# Patient Record
Sex: MALE | Race: WHITE
[De-identification: names, ages, dates, MRNs, and addresses within clinical notes are randomized per-mention and may not be internally consistent; named-entity substitution may affect disease eponyms.]

---

## 2018-07-22 ENCOUNTER — HOSPITAL ENCOUNTER (INPATIENT)
Dept: HOSPITAL 11 - JP.ED | Age: 83
LOS: 3 days | Discharge: HOME HEALTH | DRG: 872 | End: 2018-07-25
Attending: INTERNAL MEDICINE | Admitting: INTERNAL MEDICINE
Payer: MEDICARE

## 2018-07-22 DIAGNOSIS — Z87.891: ICD-10-CM

## 2018-07-22 DIAGNOSIS — H91.90: ICD-10-CM

## 2018-07-22 DIAGNOSIS — E11.40: ICD-10-CM

## 2018-07-22 DIAGNOSIS — B96.1: ICD-10-CM

## 2018-07-22 DIAGNOSIS — Z92.21: ICD-10-CM

## 2018-07-22 DIAGNOSIS — A41.9: Primary | ICD-10-CM

## 2018-07-22 DIAGNOSIS — M54.9: ICD-10-CM

## 2018-07-22 DIAGNOSIS — Z66: ICD-10-CM

## 2018-07-22 DIAGNOSIS — K52.9: ICD-10-CM

## 2018-07-22 DIAGNOSIS — Z90.49: ICD-10-CM

## 2018-07-22 DIAGNOSIS — Z85.6: ICD-10-CM

## 2018-07-22 DIAGNOSIS — N40.0: ICD-10-CM

## 2018-07-22 DIAGNOSIS — A41.59: ICD-10-CM

## 2018-07-22 DIAGNOSIS — N39.0: ICD-10-CM

## 2018-07-22 DIAGNOSIS — R53.1: ICD-10-CM

## 2018-07-22 DIAGNOSIS — Z79.82: ICD-10-CM

## 2018-07-22 DIAGNOSIS — G89.29: ICD-10-CM

## 2018-07-22 DIAGNOSIS — R50.9: ICD-10-CM

## 2018-07-22 PROCEDURE — 87077 CULTURE AEROBIC IDENTIFY: CPT

## 2018-07-22 PROCEDURE — 71045 X-RAY EXAM CHEST 1 VIEW: CPT

## 2018-07-22 PROCEDURE — 87186 SC STD MICRODIL/AGAR DIL: CPT

## 2018-07-22 PROCEDURE — 80053 COMPREHEN METABOLIC PANEL: CPT

## 2018-07-22 PROCEDURE — 86140 C-REACTIVE PROTEIN: CPT

## 2018-07-22 PROCEDURE — 36415 COLL VENOUS BLD VENIPUNCTURE: CPT

## 2018-07-22 PROCEDURE — 81001 URINALYSIS AUTO W/SCOPE: CPT

## 2018-07-22 PROCEDURE — 83605 ASSAY OF LACTIC ACID: CPT

## 2018-07-22 PROCEDURE — C9113 INJ PANTOPRAZOLE SODIUM, VIA: HCPCS

## 2018-07-22 PROCEDURE — 87040 BLOOD CULTURE FOR BACTERIA: CPT

## 2018-07-22 PROCEDURE — 85025 COMPLETE CBC W/AUTO DIFF WBC: CPT

## 2018-07-22 PROCEDURE — 87086 URINE CULTURE/COLONY COUNT: CPT

## 2018-07-22 PROCEDURE — 99285 EMERGENCY DEPT VISIT HI MDM: CPT

## 2018-07-22 PROCEDURE — 93005 ELECTROCARDIOGRAM TRACING: CPT

## 2018-07-22 NOTE — PCM.HP
H&P History of Present Illness





- General


Date of Service: 07/22/18


Admit Problem/Dx: 


 Admission Diagnosis/Problem





Admission Diagnosis/Problem      Urosepsis








Source of Information: Patient


History Limitations: Reports: No Limitations





- History of Present Illness


Initial Comments - Free Text/Narative: 








This man arrived by EMS with complaint of weakness and fever.  It started last 

night with not feeling well. At noon today he felt cold and then suddenly weak. 

No N/V.  Hx chronic diarrhea.  History of Lymphoma chemo 2014.  Today had pain 

in his left leg.  Hurt to move it.  Doesn't hurt now.  Wife wants him admitted.





Onset of Symptoms: Reports: Gradual


Duration of Symptoms: Reports: Week(s): (one week of urinary symptoms, weak flow

, pain,pressure and intermittent fevers.)


Location: Reports: Generalized (fever)


Severity: Moderate


Improves with: Reports: None


Worsens with: Reports: None


Associated Symptoms: Reports: Fever/Chills, Malaise, Other (dysuria)





- Related Data


Allergies/Adverse Reactions: 


 Allergies











Allergy/AdvReac Type Severity Reaction Status Date / Time


 


No Known Allergies Allergy   Verified 07/22/18 19:50











Home Medications: 


 Home Meds





Aspirin [Rosa Chewable] 81 mg PO DAILY 07/22/18 [History]


Bifidobacterium Infantis [Align] 4 mg PO DAILY 07/22/18 [History]


Colestipol HCl 2 tab PO BEDTIME 07/22/18 [History]


Finasteride 5 mg PO DAILY 07/22/18 [History]


Loperamide [Imodium] 2 mg PO BID 07/22/18 [History]


Simethicone [Phazyme] 250 mg PO DAILY PRN 07/22/18 [History]











Past Medical History


HEENT History: Reports: Hard of Hearing


Other HEENT History: hearing aides


Gastrointestinal History: Reports: Other (See Below)


Other Gastrointestinal History: diarrhea,


Genitourinary History: Reports: BPH, Prostate Disorder


Musculoskeletal History: Reports: Back Pain, Chronic


Neurological History: Reports: Neuropathy, Diabetic


Endocrine/Metabolic History: Reports: Diabetes, Type II


Hematologic History: Reports: Anemia


Oncologic (Cancer) History: Reports: Other (See Below)


Other Oncologic History: leukemia





- Past Surgical History


GI Surgical History: Reports: Colonoscopy, Other (See Below)


Other GI Surgeries/Procedures: portion of small and large intestine removed 

2014 b/c of tumor when he had leukemia


Oncologic Surgical History: Reports: None


Dermatological Surgical History: Reports: Other (See Below)





Social & Family History





- Family History


Family Medical History: Noncontributory





- Tobacco Use


Smoking Status *Q: Former Smoker


Used Tobacco, but Quit: Yes


Month/Year Tobacco Last Used: 30





- Caffeine Use


Caffeine Use: Reports: Coffee





- Alcohol Use


Days Per Week of Alcohol Use: 5


Number of Drinks Per Day: 2


Total Drinks Per Week: 10





- Recreational Drug Use


Recreational Drug Use: No





- Living Situation & Occupation


Living situation: Reports: 


Occupation: Retired (lives with Wife in [place], [place]. has a [place] home in 

[place], [place]. has been there for the past 10 days.)





H&P Review of Systems





- Review of Systems:


Review Of Systems: See Below


General: Reports: Fever, Chills, Weakness, Fatigue, Decreased Appetite


HEENT: Reports: Glasses, Other (partial)


Pulmonary: Reports: No Symptoms


Cardiovascular: Reports: No Symptoms


Gastrointestinal: Reports: Abdominal Pain (low pelvic pain), Decreased Appetite

, Nausea, Other (last bowel movement 7/21/2018)


Genitourinary: Reports: Dysuria, Frequency, Burning, Pain, Urgency, Other (

bladder symptoms for one week.)


Musculoskeletal: Reports: No Symptoms


Skin: Reports: No Symptoms


Psychiatric: Reports: No Symptoms


Neurological: Reports: Weakness


Hematologic/Lymphatic: Reports: No Symptoms


Immunologic: Reports: No Symptoms





Exam





- Exam


Exam: See Below





- Vital Signs


Vital Signs: 


 Last Vital Signs











Temp  38.8 C H  07/22/18 22:43


 


Pulse  97   07/22/18 22:01


 


Resp  16   07/22/18 22:01


 


BP  124/65   07/22/18 22:01


 


Pulse Ox  98   07/22/18 22:01











Weight: 144 kg





- Exam


General: Alert, Oriented, Cooperative, Mild Distress


HEENT: PERRLA, Hearing Intact, Mucosa Moist & Pink, Nares Patent, Normal Nasal 

Septum, Posterior Pharynx Clear, Conjunctiva Clear, EOMI, EACs Clear, TMs Clear


Neck: Supple, Trachea Midline


Lungs: Clear to Auscultation, Normal Respiratory Effort


Cardiovascular: Regular Rate, Regular Rhythm


GI/Abdominal Exam: Normal Bowel Sounds, Soft, No Organomegaly, No Distention, 

No Abnormal Bruit, No Mass, Pelvis Stable, Tender (over low pelvis area to 

palpation)


 (Male) Exam: Deferred


Rectal (Males) Exam: Deferred


Back Exam: Normal Inspection, Full Range of Motion, NT


Extremities: Normal Inspection, Normal Range of Motion, Non-Tender, No Pedal 

Edema, Normal Capillary Refill


Peripheral Pulses: 2+: Radial (L), Radial (R)


Skin: Warm, Dry, Intact


Neurological: Reflexes Equal Bilateral, Strength Equal Bilateral


Neuro Extensive - Mental Status: Alert, Oriented x3, Normal Mood/Affect, Normal 

Cognition


Neuro Extensive - Motor, Sensory, Reflexes: CN II-XII Intact


Psychiatric: Alert, Normal Affect, Normal Mood





- Patient Data


Lab Results Last 24 hrs: 


 Laboratory Results - last 24 hr











  07/22/18 07/22/18 07/22/18 Range/Units





  20:45 20:45 20:45 


 


WBC    12.1 H  (4.5-11.0)  K/uL


 


RBC    3.97 L  (4.30-5.90)  M/uL


 


Hgb    12.7  (12.0-15.0)  g/dL


 


Hct    36.7 L  (40.0-54.0)  %


 


MCV    92  (80-98)  fL


 


MCH    32 H  (27-31)  pg


 


MCHC    35  (32-36)  %


 


Plt Count    147 L  (150-400)  K/uL


 


Neut % (Auto)    78 H  (36-66)  %


 


Lymph % (Auto)    11 L  (24-44)  %


 


Mono % (Auto)    11 H  (2-6)  %


 


Eos % (Auto)    0 L  (2-4)  %


 


Baso % (Auto)    0  (0-1)  %


 


Sodium     (140-148)  mmol/L


 


Potassium     (3.6-5.2)  mmol/L


 


Chloride     (100-108)  mmol/L


 


Carbon Dioxide     (21-32)  mmol/L


 


Anion Gap     (5.0-14.0)  mmol/L


 


BUN     (7-18)  mg/dL


 


Creatinine     (0.8-1.3)  mg/dL


 


Est Cr Clr Drug Dosing     mL/min


 


Estimated GFR (MDRD)     (>60)  


 


Glucose     ()  mg/dL


 


Lactic Acid  1.6    (0.4-2.0)  mmol/L


 


Calcium     (8.5-10.1)  mg/dL


 


Total Bilirubin     (0.2-1.0)  mg/dL


 


AST     (15-37)  U/L


 


ALT     (12-78)  U/L


 


Alkaline Phosphatase     ()  U/L


 


C-Reactive Protein   8.64 H   (0.0-0.3)  mg/dL


 


Total Protein     (6.4-8.2)  g/dL


 


Albumin     (3.4-5.0)  g/dL


 


Globulin     (2.3-3.5)  g/dL


 


Albumin/Globulin Ratio     (1.2-2.2)  


 


Urine Color     


 


Urine Appearance     


 


Urine pH     (4.5-8.0)  


 


Ur Specific Gravity     (1.008-1.030)  


 


Urine Protein     (NEGATIVE)  mg/dL


 


Urine Glucose (UA)     (NEGATIVE)  mg/dL


 


Urine Ketones     (NEGATIVE)  mg/dL


 


Urine Occult Blood     (NEGATIVE)  


 


Urine Nitrite     (NEGAITVE)  


 


Urine Bilirubin     (NEGATIVE)  


 


Urine Urobilinogen     (NORMAL)  mg/dL


 


Ur Leukocyte Esterase     (NEGATIVE)  


 


Urine RBC     (0-5)  


 


Urine WBC     (0-5)  


 


Ur Epithelial Cells     


 


Amorphous Sediment     


 


Urine Bacteria     


 


Urine Mucus     














  07/22/18 07/22/18 Range/Units





  20:45 21:49 


 


WBC    (4.5-11.0)  K/uL


 


RBC    (4.30-5.90)  M/uL


 


Hgb    (12.0-15.0)  g/dL


 


Hct    (40.0-54.0)  %


 


MCV    (80-98)  fL


 


MCH    (27-31)  pg


 


MCHC    (32-36)  %


 


Plt Count    (150-400)  K/uL


 


Neut % (Auto)    (36-66)  %


 


Lymph % (Auto)    (24-44)  %


 


Mono % (Auto)    (2-6)  %


 


Eos % (Auto)    (2-4)  %


 


Baso % (Auto)    (0-1)  %


 


Sodium  131 L   (140-148)  mmol/L


 


Potassium  4.2   (3.6-5.2)  mmol/L


 


Chloride  96 L   (100-108)  mmol/L


 


Carbon Dioxide  24   (21-32)  mmol/L


 


Anion Gap  15.2 H   (5.0-14.0)  mmol/L


 


BUN  14   (7-18)  mg/dL


 


Creatinine  1.1   (0.8-1.3)  mg/dL


 


Est Cr Clr Drug Dosing  49.77   mL/min


 


Estimated GFR (MDRD)  > 60   (>60)  


 


Glucose  158 H   ()  mg/dL


 


Lactic Acid    (0.4-2.0)  mmol/L


 


Calcium  8.5   (8.5-10.1)  mg/dL


 


Total Bilirubin  0.7   (0.2-1.0)  mg/dL


 


AST  23   (15-37)  U/L


 


ALT  25   (12-78)  U/L


 


Alkaline Phosphatase  87   ()  U/L


 


C-Reactive Protein    (0.0-0.3)  mg/dL


 


Total Protein  7.0   (6.4-8.2)  g/dL


 


Albumin  3.3 L   (3.4-5.0)  g/dL


 


Globulin  3.7 H   (2.3-3.5)  g/dL


 


Albumin/Globulin Ratio  0.9 L   (1.2-2.2)  


 


Urine Color   Yellow  


 


Urine Appearance   Cloudy  


 


Urine pH   5.0  (4.5-8.0)  


 


Ur Specific Gravity   1.020  (1.008-1.030)  


 


Urine Protein   30 H  (NEGATIVE)  mg/dL


 


Urine Glucose (UA)   Normal  (NEGATIVE)  mg/dL


 


Urine Ketones   15 H  (NEGATIVE)  mg/dL


 


Urine Occult Blood   Large  (NEGATIVE)  


 


Urine Nitrite   Negative  (NEGAITVE)  


 


Urine Bilirubin   Negative  (NEGATIVE)  


 


Urine Urobilinogen   Normal  (NORMAL)  mg/dL


 


Ur Leukocyte Esterase   Large  (NEGATIVE)  


 


Urine RBC   Semi-packed H  (0-5)  


 


Urine WBC   Semi-packed H  (0-5)  


 


Ur Epithelial Cells   Few  


 


Amorphous Sediment   Not seen  


 


Urine Bacteria   Many  


 


Urine Mucus   Not seen  











Result Diagrams: 


 07/22/18 20:45





 07/22/18 20:45





- Problem List


(1) Sepsis due to urinary tract infection


SNOMED Code(s): 401013261


   ICD Code: A41.9 - SEPSIS, UNSPECIFIED ORGANISM; N39.0 - URINARY TRACT 

INFECTION, SITE NOT SPECIFIED   Status: Acute   Priority: High   Current Visit: 

Yes   





(2) Diabetes mellitus type 2 in nonobese


SNOMED Code(s): 481526906


   ICD Code: E11.9 - TYPE 2 DIABETES MELLITUS WITHOUT COMPLICATIONS   Status: 

Acute   Priority: High   Current Visit: Yes   


Problem List Initiated/Reviewed/Updated: Yes


Orders Last 24hrs: 


 Active Orders 24 hr











 Category Date Time Status


 


 Patient Status Manage Transfer [TRANSFER] Routine ADT  07/22/18 22:38 Active


 


 EKG Documentation Completion [RC] ASDIRECTED Care  07/22/18 20:42 Active


 


 Chest 1V Frontal [CR] Urgent Exams  07/22/18 20:41 Taken


 


 CULTURE BLOOD [BC] Urgent Lab  07/22/18 20:45 Received


 


 CULTURE BLOOD [BC] Urgent Lab  07/22/18 20:45 Received


 


 LACTIC ACID [CHEM] Stat Lab  07/23/18 04:11 Ordered


 


 UA W/MICROSCOPIC [URIN] Urgent Lab  07/22/18 21:49 Ordered


 


 Ciprofloxacin in D5W [Cipro in D5W 400 MG/200 ML] 400 Med  07/22/18 22:15 

Active





 mg   





 Premix Bag 1 bag   





 IV Q12H   


 


 Sodium Chloride 0.9% [Normal Saline] 1,000 ml Med  07/22/18 22:15 Active





 IV ASDIRECTED   


 


 Blood Culture x2 Reflex Set [OM.PC] Urgent Oth  07/22/18 20:42 Ordered


 


 Resuscitation Status Routine Resus Stat  07/22/18 22:39 Ordered


 


 EKG 12 Lead [EK] Urgent Ther  07/22/18 20:42 Ordered








 Medication Orders





Ciprofloxacin/Dextrose 400 mg/ (Premix)  200 mls @ 200 mls/hr IV Q12H [facility]


   Last Admin: 07/22/18 22:32  Dose: 200 mls/hr


Sodium Chloride (Normal Saline)  1,000 mls @ 999 mls/hr IV ASDIRECTED [facility]


   Last Admin: 07/22/18 22:32  Dose: 999 mls/hr








Assessment/Plan Comment:: 








Admission Template





ASSESSMENT / PLAN





This man arrived by EMS with complaint of weakness and fever.  It started last 

night with not feeling well. At noon today he felt cold and then suddenly weak. 

No N/V.  Hx chronic diarrhea.  History of Lymphoma chemo 2014.  Today had pain 

in his left leg.  Hurt to move it.  Doesn't hurt now.  Wife wants him admitted.





In ER, Mr Macedo noted to have fever 101.9-97-16 /65, his labsWBC 12.1, 

hgb 12.7, chemistry Na+131, K+ 4.2, cl 96, anion gap 15.2, bun 14, creat 1.1, 

clucose 158, Urinalysis WBC packed, RBC packed, bacteria many.  IV fluids and 

IV Cipro started in the ER. 





discussed plan of care with Mr. Macedo, he agrees to admission, but has concerns 

with staying in hospitial due to his Wife is traveling home today to [place], [place]. but will stay until he is well. 





Sepsis due to bladder infection


-Admit to [address] [place] [address] for further monitoring


-IV Fluids for rehydration NS at 125 mL per hour


-IV Antibiotic: Cipro 400 gram IV every 12 hours   


-Tyenol for fever control        


-Advise to notify nurses of any chest pain or other symptoms


-blood cultures x2 pending


-urine culture pending


-And a.m. labs: CBC, BMP, lactic acid at 0410





Diabetes type 2; diet controlled


-bmp in am


-consistent carb diet





Maintenance issues


-Orders home meds: ordered


-Nutrition: diabetic diet


-Graham catheter not indicated at this time


-DVT: SCD


-PPI: IV Protonix 40mg daily





CODE STATUS: DNR/DNI





Admission status: Admit to [address] [place] [address]


Admission justification.  This patient will be admitted for inpatient services 

and is medically appropriate meeting medical necessity for inpatient admission 

as outlined in my documentation. I reasonably expect the patient will require 

inpatient services that span.  Time over 2 midnights.  I reasonably expect this 

patient to be discharged or transferred within 96 hours after admission to the 

critical access hospital.





Disposition; home 





Primary care provider: Outside Provider, ALEXIS Webb





Hospitalist: Dr. Hansen

## 2018-07-22 NOTE — EDM.PDOC
ED HPI GENERAL MEDICAL PROBLEM





- General


Chief Complaint: General


Stated Complaint: WEAK VIA NORTH


Time Seen by Provider: 07/22/18 20:24


Source of Information: Reports: Patient, Family


History Limitations: Reports: No Limitations





- History of Present Illness


INITIAL COMMENTS - FREE TEXT/NARRATIVE: 





This man arrived by EMS with complaint of weakness and fever.  It started last 

night with not feeling well. At noon today he felt cold and then suddenly weak. 

No N/V.  Hx chronic diarrhea.  History of Lymphoma chemo 2014.  Today had pain 

in his left leg.  Hurt to move it.  Doesn't hurt now.  Wife wants him admitted.





- Related Data


 Allergies











Allergy/AdvReac Type Severity Reaction Status Date / Time


 


No Known Allergies Allergy   Verified 07/22/18 19:50











Home Meds: 


 Home Meds





Aspirin [Rosa Chewable] 81 mg PO DAILY 07/22/18 [History]


Bifidobacterium Infantis [Align] 4 mg PO DAILY 07/22/18 [History]


Colestipol HCl 2 tab PO BEDTIME 07/22/18 [History]


Finasteride 5 mg PO DAILY 07/22/18 [History]


Loperamide [Imodium] 2 mg PO BID 07/22/18 [History]


Simethicone [Phazyme] 250 mg PO DAILY PRN 07/22/18 [History]











Past Medical History


HEENT History: Reports: Hard of Hearing


Other HEENT History: hearing aides


Gastrointestinal History: Reports: Other (See Below)


Other Gastrointestinal History: diarrhea,


Genitourinary History: Reports: BPH, Prostate Disorder


Musculoskeletal History: Reports: Back Pain, Chronic


Neurological History: Reports: Neuropathy, Diabetic


Endocrine/Metabolic History: Reports: Diabetes, Type II


Hematologic History: Reports: Anemia


Oncologic (Cancer) History: Reports: Other (See Below)


Other Oncologic History: leukemia





- Past Surgical History


GI Surgical History: Reports: Colonoscopy, Other (See Below)


Other GI Surgeries/Procedures: portion of small and large intestine removed 

2014 b/c of tumor when he had leukemia


Oncologic Surgical History: Reports: None


Dermatological Surgical History: Reports: Other (See Below)





Social & Family History





- Family History


Family Medical History: Noncontributory





- Tobacco Use


Smoking Status *Q: Former Smoker


Used Tobacco, but Quit: Yes


Month/Year Tobacco Last Used: 30





- Caffeine Use


Caffeine Use: Reports: Coffee





- Alcohol Use


Days Per Week of Alcohol Use: 5


Number of Drinks Per Day: 2


Total Drinks Per Week: 10





- Recreational Drug Use


Recreational Drug Use: No





ED ROS GENERAL





- Review of Systems


Review Of Systems: See Below


Constitutional: Reports: Fever, Weakness


HEENT: Reports: No Symptoms


Respiratory: Reports: No Symptoms


Cardiovascular: Reports: No Symptoms


Endocrine: Reports: No Symptoms


GI/Abdominal: Reports: Diarrhea (chronic)


: Reports: No Symptoms


Musculoskeletal: Reports: Other (lt leg apin resolved)


Skin: Reports: No Symptoms


Neurological: Reports: No Symptoms


Psychiatric: Reports: No Symptoms


Hematologic/Lymphatic: Reports: No Symptoms


Immunologic: Reports: No Symptoms





ED EXAM, GENERAL





- Physical Exam


Exam: See Below


Exam Limited By: No Limitations


General Appearance: Alert, WD/WN, No Apparent Distress, Other (smiling talkative

)


Eye Exam: Bilateral Eye: EOMI, PERRL


Ears: Normal TMs, Other (hearing aides)


Nose: Normal Inspection


Throat/Mouth: Normal Inspection, Normal Oropharynx


Head: Atraumatic


Neck: Normal Inspection, Supple


Respiratory/Chest: Lungs Clear


Cardiovascular: Normal Peripheral Pulses, Regular Rate, Rhythm, No Murmur


GI/Abdominal: Normal Bowel Sounds, Soft, Non-Tender, Other (lower midline scar)


Back Exam: Normal Inspection


Extremities: Normal Inspection, Non-Tender, No Pedal Edema


Neurological: Alert, Oriented, CN II-XII Intact, Normal Cognition, No Motor/

Sensory Deficits


Psychiatric: Normal Affect


Skin Exam: Warm, Dry





Course





- Vital Signs


Last Recorded V/S: 


 Last Vital Signs











Temp  38.8 C H  07/22/18 22:01


 


Pulse  97   07/22/18 22:01


 


Resp  16   07/22/18 22:01


 


BP  124/65   07/22/18 22:01


 


Pulse Ox  98   07/22/18 22:01














- Orders/Labs/Meds


Orders: 


 Active Orders 24 hr











 Category Date Time Status


 


 EKG Documentation Completion [RC] ASDIRECTED Care  07/22/18 20:42 Active


 


 Chest 1V Frontal [CR] Urgent Exams  07/22/18 20:41 Taken


 


 CULTURE BLOOD [BC] Urgent Lab  07/22/18 20:45 Received


 


 CULTURE BLOOD [BC] Urgent Lab  07/22/18 20:45 Received


 


 UA W/MICROSCOPIC [URIN] Urgent Lab  07/22/18 21:49 Ordered


 


 Blood Culture x2 Reflex Set [OM.PC] Urgent Oth  07/22/18 20:42 Ordered


 


 EKG 12 Lead [EK] Urgent Ther  07/22/18 20:42 Ordered











Labs: 


 Laboratory Tests











  07/22/18 07/22/18 07/22/18 Range/Units





  20:45 20:45 20:45 


 


WBC    12.1 H  (4.5-11.0)  K/uL


 


RBC    3.97 L  (4.30-5.90)  M/uL


 


Hgb    12.7  (12.0-15.0)  g/dL


 


Hct    36.7 L  (40.0-54.0)  %


 


MCV    92  (80-98)  fL


 


MCH    32 H  (27-31)  pg


 


MCHC    35  (32-36)  %


 


Plt Count    147 L  (150-400)  K/uL


 


Neut % (Auto)    78 H  (36-66)  %


 


Lymph % (Auto)    11 L  (24-44)  %


 


Mono % (Auto)    11 H  (2-6)  %


 


Eos % (Auto)    0 L  (2-4)  %


 


Baso % (Auto)    0  (0-1)  %


 


Sodium     (140-148)  mmol/L


 


Potassium     (3.6-5.2)  mmol/L


 


Chloride     (100-108)  mmol/L


 


Carbon Dioxide     (21-32)  mmol/L


 


Anion Gap     (5.0-14.0)  mmol/L


 


BUN     (7-18)  mg/dL


 


Creatinine     (0.8-1.3)  mg/dL


 


Est Cr Clr Drug Dosing     mL/min


 


Estimated GFR (MDRD)     (>60)  


 


Glucose     ()  mg/dL


 


Lactic Acid  1.6    (0.4-2.0)  mmol/L


 


Calcium     (8.5-10.1)  mg/dL


 


Total Bilirubin     (0.2-1.0)  mg/dL


 


AST     (15-37)  U/L


 


ALT     (12-78)  U/L


 


Alkaline Phosphatase     ()  U/L


 


C-Reactive Protein   8.64 H   (0.0-0.3)  mg/dL


 


Total Protein     (6.4-8.2)  g/dL


 


Albumin     (3.4-5.0)  g/dL


 


Globulin     (2.3-3.5)  g/dL


 


Albumin/Globulin Ratio     (1.2-2.2)  


 


Urine Color     


 


Urine Appearance     


 


Urine pH     (4.5-8.0)  


 


Ur Specific Gravity     (1.008-1.030)  


 


Urine Protein     (NEGATIVE)  mg/dL


 


Urine Glucose (UA)     (NEGATIVE)  mg/dL


 


Urine Ketones     (NEGATIVE)  mg/dL


 


Urine Occult Blood     (NEGATIVE)  


 


Urine Nitrite     (NEGAITVE)  


 


Urine Bilirubin     (NEGATIVE)  


 


Urine Urobilinogen     (NORMAL)  mg/dL


 


Ur Leukocyte Esterase     (NEGATIVE)  


 


Urine RBC     (0-5)  


 


Urine WBC     (0-5)  


 


Ur Epithelial Cells     


 


Amorphous Sediment     


 


Urine Bacteria     


 


Urine Mucus     














  07/22/18 07/22/18 Range/Units





  20:45 21:49 


 


WBC    (4.5-11.0)  K/uL


 


RBC    (4.30-5.90)  M/uL


 


Hgb    (12.0-15.0)  g/dL


 


Hct    (40.0-54.0)  %


 


MCV    (80-98)  fL


 


MCH    (27-31)  pg


 


MCHC    (32-36)  %


 


Plt Count    (150-400)  K/uL


 


Neut % (Auto)    (36-66)  %


 


Lymph % (Auto)    (24-44)  %


 


Mono % (Auto)    (2-6)  %


 


Eos % (Auto)    (2-4)  %


 


Baso % (Auto)    (0-1)  %


 


Sodium  131 L   (140-148)  mmol/L


 


Potassium  4.2   (3.6-5.2)  mmol/L


 


Chloride  96 L   (100-108)  mmol/L


 


Carbon Dioxide  24   (21-32)  mmol/L


 


Anion Gap  15.2 H   (5.0-14.0)  mmol/L


 


BUN  14   (7-18)  mg/dL


 


Creatinine  1.1   (0.8-1.3)  mg/dL


 


Est Cr Clr Drug Dosing  49.77   mL/min


 


Estimated GFR (MDRD)  > 60   (>60)  


 


Glucose  158 H   ()  mg/dL


 


Lactic Acid    (0.4-2.0)  mmol/L


 


Calcium  8.5   (8.5-10.1)  mg/dL


 


Total Bilirubin  0.7   (0.2-1.0)  mg/dL


 


AST  23   (15-37)  U/L


 


ALT  25   (12-78)  U/L


 


Alkaline Phosphatase  87   ()  U/L


 


C-Reactive Protein    (0.0-0.3)  mg/dL


 


Total Protein  7.0   (6.4-8.2)  g/dL


 


Albumin  3.3 L   (3.4-5.0)  g/dL


 


Globulin  3.7 H   (2.3-3.5)  g/dL


 


Albumin/Globulin Ratio  0.9 L   (1.2-2.2)  


 


Urine Color   Yellow  


 


Urine Appearance   Cloudy  


 


Urine pH   5.0  (4.5-8.0)  


 


Ur Specific Gravity   1.020  (1.008-1.030)  


 


Urine Protein   30 H  (NEGATIVE)  mg/dL


 


Urine Glucose (UA)   Normal  (NEGATIVE)  mg/dL


 


Urine Ketones   15 H  (NEGATIVE)  mg/dL


 


Urine Occult Blood   Large  (NEGATIVE)  


 


Urine Nitrite   Negative  (NEGAITVE)  


 


Urine Bilirubin   Negative  (NEGATIVE)  


 


Urine Urobilinogen   Normal  (NORMAL)  mg/dL


 


Ur Leukocyte Esterase   Large  (NEGATIVE)  


 


Urine RBC   Semi-packed H  (0-5)  


 


Urine WBC   Semi-packed H  (0-5)  


 


Ur Epithelial Cells   Few  


 


Amorphous Sediment   Not seen  


 


Urine Bacteria   Many  


 


Urine Mucus   Not seen  














- Radiology Interpretation


Free Text/Narrative:: 





some scarring on right but no infiltrate





- Re-Assessments/Exams


Free Text/Narrative Re-Assessment/Exam: 





07/22/18 22:12


labs suggest urosepsis.  Angelina Whyte here and will admit.





Departure





- Departure


Time of Disposition: 22:13


Disposition: Admitted As Inpatient 66


Condition: Fair


Clinical Impression: 


 Urinary tract infection, Sepsis








- Discharge Information


Referrals: 


PCP,None [Primary Care Provider] - 


Forms:  ED Department Discharge





- My Orders


Last 24 Hours: 


My Active Orders





07/22/18 20:41


Chest 1V Frontal [CR] Urgent 





07/22/18 20:42


EKG Documentation Completion [RC] ASDIRECTED 


Blood Culture x2 Reflex Set [OM.PC] Urgent 


EKG 12 Lead [EK] Urgent 





07/22/18 20:45


CULTURE BLOOD [BC] Urgent 


CULTURE BLOOD [BC] Urgent 





07/22/18 21:49


UA W/MICROSCOPIC [URIN] Urgent 














- Assessment/Plan


Last 24 Hours: 


My Active Orders





07/22/18 20:41


Chest 1V Frontal [CR] Urgent 





07/22/18 20:42


EKG Documentation Completion [RC] ASDIRECTED 


Blood Culture x2 Reflex Set [OM.PC] Urgent 


EKG 12 Lead [EK] Urgent 





07/22/18 20:45


CULTURE BLOOD [BC] Urgent 


CULTURE BLOOD [BC] Urgent 





07/22/18 21:49


UA W/MICROSCOPIC [URIN] Urgent

## 2018-07-23 RX ADMIN — Medication SCH CAP: at 21:23

## 2018-07-23 RX ADMIN — COLESTIPOL HYDROCHLORIDE SCH GM: 1 TABLET ORAL at 21:23

## 2018-07-23 NOTE — PCM.PN
- General Info


Date of Service: 07/23/18


Subjective Update: 





Mr. Macedo is an 86-year-old gentleman who was admitted last night with weakness 

secondary to urinary tract infection and early sepsis. Blood pressures have 

been somewhat borderline since admission and he has received extra IV fluids, 

lactic acid level was mildly elevated this morning. Blood cultures 2 are 

growing gram-negative rods, final ID and sensitivities pending. White blood 

cell count has improved since admission and is now within normal range. He 

feels stronger and much less weak since admission.





- Review of Systems


General: Reports: Fever, Weakness, Chills


Pulmonary: Reports: No Symptoms


Cardiovascular: Reports: No Symptoms


Gastrointestinal: Reports: No Symptoms


Genitourinary: Reports: Dysuria.  Denies: Frequency, Urgency, Hematuria, 

Retention





- Patient Data


Vitals - Most Recent: 


 Last Vital Signs











Temp  100.8 F H  07/23/18 11:12


 


Pulse  86   07/23/18 06:41


 


Resp  16   07/23/18 11:08


 


BP  90/77   07/23/18 11:08


 


Pulse Ox  96   07/23/18 11:08











Weight - Most Recent: 142 lb 14.388 oz


I&O - Last 24 Hours: 


 Intake & Output











 07/22/18 07/23/18 07/23/18





 22:59 06:59 14:59


 


Intake Total  1555 50


 


Output Total  600 


 


Balance  955 50











Lab Results Last 24 Hours: 


 Laboratory Results - last 24 hr











  07/22/18 07/22/18 07/22/18 Range/Units





  20:45 20:45 20:45 


 


WBC    12.1 H  (4.5-11.0)  K/uL


 


RBC    3.97 L  (4.30-5.90)  M/uL


 


Hgb    12.7  (12.0-15.0)  g/dL


 


Hct    36.7 L  (40.0-54.0)  %


 


MCV    92  (80-98)  fL


 


MCH    32 H  (27-31)  pg


 


MCHC    35  (32-36)  %


 


Plt Count    147 L  (150-400)  K/uL


 


Neut % (Auto)    78 H  (36-66)  %


 


Lymph % (Auto)    11 L  (24-44)  %


 


Mono % (Auto)    11 H  (2-6)  %


 


Eos % (Auto)    0 L  (2-4)  %


 


Baso % (Auto)    0  (0-1)  %


 


Sodium     (140-148)  mmol/L


 


Potassium     (3.6-5.2)  mmol/L


 


Chloride     (100-108)  mmol/L


 


Carbon Dioxide     (21-32)  mmol/L


 


Anion Gap     (5.0-14.0)  mmol/L


 


BUN     (7-18)  mg/dL


 


Creatinine     (0.8-1.3)  mg/dL


 


Est Cr Clr Drug Dosing     mL/min


 


Estimated GFR (MDRD)     (>60)  


 


Glucose     ()  mg/dL


 


Lactic Acid  1.6    (0.4-2.0)  mmol/L


 


Calcium     (8.5-10.1)  mg/dL


 


Total Bilirubin     (0.2-1.0)  mg/dL


 


AST     (15-37)  U/L


 


ALT     (12-78)  U/L


 


Alkaline Phosphatase     ()  U/L


 


C-Reactive Protein   8.64 H   (0.0-0.3)  mg/dL


 


Total Protein     (6.4-8.2)  g/dL


 


Albumin     (3.4-5.0)  g/dL


 


Globulin     (2.3-3.5)  g/dL


 


Albumin/Globulin Ratio     (1.2-2.2)  


 


Urine Color     


 


Urine Appearance     


 


Urine pH     (4.5-8.0)  


 


Ur Specific Gravity     (1.008-1.030)  


 


Urine Protein     (NEGATIVE)  mg/dL


 


Urine Glucose (UA)     (NEGATIVE)  mg/dL


 


Urine Ketones     (NEGATIVE)  mg/dL


 


Urine Occult Blood     (NEGATIVE)  


 


Urine Nitrite     (NEGAITVE)  


 


Urine Bilirubin     (NEGATIVE)  


 


Urine Urobilinogen     (NORMAL)  mg/dL


 


Ur Leukocyte Esterase     (NEGATIVE)  


 


Urine RBC     (0-5)  


 


Urine WBC     (0-5)  


 


Ur Epithelial Cells     


 


Amorphous Sediment     


 


Urine Bacteria     


 


Urine Mucus     














  07/22/18 07/22/18 07/23/18 Range/Units





  20:45 21:49 05:45 


 


WBC    10.4  (4.5-11.0)  K/uL


 


RBC    3.88 L  (4.30-5.90)  M/uL


 


Hgb    12.0  (12.0-15.0)  g/dL


 


Hct    36.2 L  (40.0-54.0)  %


 


MCV    93  (80-98)  fL


 


MCH    31  (27-31)  pg


 


MCHC    33  (32-36)  %


 


Plt Count    117 L  (150-400)  K/uL


 


Neut % (Auto)    78 H  (36-66)  %


 


Lymph % (Auto)    11 L  (24-44)  %


 


Mono % (Auto)    11 H  (2-6)  %


 


Eos % (Auto)    0 L  (2-4)  %


 


Baso % (Auto)    0  (0-1)  %


 


Sodium  131 L    (140-148)  mmol/L


 


Potassium  4.2    (3.6-5.2)  mmol/L


 


Chloride  96 L    (100-108)  mmol/L


 


Carbon Dioxide  24    (21-32)  mmol/L


 


Anion Gap  15.2 H    (5.0-14.0)  mmol/L


 


BUN  14    (7-18)  mg/dL


 


Creatinine  1.1    (0.8-1.3)  mg/dL


 


Est Cr Clr Drug Dosing  49.77    mL/min


 


Estimated GFR (MDRD)  > 60    (>60)  


 


Glucose  158 H    ()  mg/dL


 


Lactic Acid     (0.4-2.0)  mmol/L


 


Calcium  8.5    (8.5-10.1)  mg/dL


 


Total Bilirubin  0.7    (0.2-1.0)  mg/dL


 


AST  23    (15-37)  U/L


 


ALT  25    (12-78)  U/L


 


Alkaline Phosphatase  87    ()  U/L


 


C-Reactive Protein     (0.0-0.3)  mg/dL


 


Total Protein  7.0    (6.4-8.2)  g/dL


 


Albumin  3.3 L    (3.4-5.0)  g/dL


 


Globulin  3.7 H    (2.3-3.5)  g/dL


 


Albumin/Globulin Ratio  0.9 L    (1.2-2.2)  


 


Urine Color   Yellow   


 


Urine Appearance   Cloudy   


 


Urine pH   5.0   (4.5-8.0)  


 


Ur Specific Gravity   1.020   (1.008-1.030)  


 


Urine Protein   30 H   (NEGATIVE)  mg/dL


 


Urine Glucose (UA)   Normal   (NEGATIVE)  mg/dL


 


Urine Ketones   15 H   (NEGATIVE)  mg/dL


 


Urine Occult Blood   Large   (NEGATIVE)  


 


Urine Nitrite   Negative   (NEGAITVE)  


 


Urine Bilirubin   Negative   (NEGATIVE)  


 


Urine Urobilinogen   Normal   (NORMAL)  mg/dL


 


Ur Leukocyte Esterase   Large   (NEGATIVE)  


 


Urine RBC   Semi-packed H   (0-5)  


 


Urine WBC   Semi-packed H   (0-5)  


 


Ur Epithelial Cells   Few   


 


Amorphous Sediment   Not seen   


 


Urine Bacteria   Many   


 


Urine Mucus   Not seen   














  07/23/18 07/23/18 Range/Units





  05:45 05:45 


 


WBC    (4.5-11.0)  K/uL


 


RBC    (4.30-5.90)  M/uL


 


Hgb    (12.0-15.0)  g/dL


 


Hct    (40.0-54.0)  %


 


MCV    (80-98)  fL


 


MCH    (27-31)  pg


 


MCHC    (32-36)  %


 


Plt Count    (150-400)  K/uL


 


Neut % (Auto)    (36-66)  %


 


Lymph % (Auto)    (24-44)  %


 


Mono % (Auto)    (2-6)  %


 


Eos % (Auto)    (2-4)  %


 


Baso % (Auto)    (0-1)  %


 


Sodium  133 L   (140-148)  mmol/L


 


Potassium  4.1   (3.6-5.2)  mmol/L


 


Chloride  98 L   (100-108)  mmol/L


 


Carbon Dioxide  24   (21-32)  mmol/L


 


Anion Gap  15.1 H   (5.0-14.0)  mmol/L


 


BUN  14   (7-18)  mg/dL


 


Creatinine  1.1   (0.8-1.3)  mg/dL


 


Est Cr Clr Drug Dosing  49.77   mL/min


 


Estimated GFR (MDRD)  > 60   (>60)  


 


Glucose  137 H   ()  mg/dL


 


Lactic Acid   2.1 H  (0.4-2.0)  mmol/L


 


Calcium  8.0 L   (8.5-10.1)  mg/dL


 


Total Bilirubin    (0.2-1.0)  mg/dL


 


AST    (15-37)  U/L


 


ALT    (12-78)  U/L


 


Alkaline Phosphatase    ()  U/L


 


C-Reactive Protein    (0.0-0.3)  mg/dL


 


Total Protein    (6.4-8.2)  g/dL


 


Albumin    (3.4-5.0)  g/dL


 


Globulin    (2.3-3.5)  g/dL


 


Albumin/Globulin Ratio    (1.2-2.2)  


 


Urine Color    


 


Urine Appearance    


 


Urine pH    (4.5-8.0)  


 


Ur Specific Gravity    (1.008-1.030)  


 


Urine Protein    (NEGATIVE)  mg/dL


 


Urine Glucose (UA)    (NEGATIVE)  mg/dL


 


Urine Ketones    (NEGATIVE)  mg/dL


 


Urine Occult Blood    (NEGATIVE)  


 


Urine Nitrite    (NEGAITVE)  


 


Urine Bilirubin    (NEGATIVE)  


 


Urine Urobilinogen    (NORMAL)  mg/dL


 


Ur Leukocyte Esterase    (NEGATIVE)  


 


Urine RBC    (0-5)  


 


Urine WBC    (0-5)  


 


Ur Epithelial Cells    


 


Amorphous Sediment    


 


Urine Bacteria    


 


Urine Mucus    











Omer Results Last 24 Hours: 


 Microbiology











 07/22/18 20:45 Aerobic Blood Culture - Preliminary





 Blood - Venous 


 


 07/22/18 20:45 Aerobic Blood Culture - Preliminary





 Blood - Venous - Lab Draw 











Med Orders - Current: 


 Current Medications





Acetaminophen (Tylenol)  650 mg PO Q4H PRN


   PRN Reason: Pain (Mild 1-3)/fever


   Last Admin: 07/23/18 11:12 Dose:  650 mg


Albuterol (Proventil Neb Soln)  2.5 mg NEB Q4H PRN


   PRN Reason: Shortness Of Breath/wheezing


Bisacodyl (Dulcolax)  5 mg PO DAILY PRN


   PRN Reason: Constipation


Colestipol HCl (Colestipol Hcl)  2 gm PO BEDTIME [facility]


Docusate Sodium (Colace)  100 mg PO BID PRN


   PRN Reason: Constipation


Enoxaparin Sodium (Lovenox)  40 mg SUBCUT DAILY [facility]


   Last Admin: 07/23/18 08:38 Dose:  40 mg


Finasteride (Proscar)  5 mg PO DAILY [facility]


   Last Admin: 07/23/18 08:38 Dose:  5 mg


Sodium Chloride (Normal Saline)  1,000 mls @ 125 mls/hr IV ASDIRECTED [facility]


   Last Admin: 07/23/18 00:33 Dose:  125 mls/hr


Ceftriaxone Sodium 1 gm/ (Sodium Chloride)  50 mls @ 100 mls/hr IV Q24H [facility]


   Last Admin: 07/23/18 09:18 Dose:  100 mls/hr


Sodium Chloride (Normal Saline)  1,000 mls @ 250 mls/hr IV ASDIRECTED [facility]


   Stop: 07/23/18 16:46


Ibuprofen (Motrin)  400 mg PO Q6H PRN


   PRN Reason: Pain (mild 1-3)


Lactobacillus Rhamnosus (Culturelle)  1 cap PO BID [facility]


Loperamide HCl (Imodium)  2 mg PO BID [facility]


   Last Admin: 07/23/18 08:38 Dose:  2 mg


Lorazepam (Ativan)  1 mg IV Q6H PRN


   PRN Reason: Nausea/Vomiting


Melatonin (Melatonin)  6 mg PO BEDTIME PRN


   PRN Reason: Insomnia


Morphine Sulfate (Morphine)  2 mg IVPUSH Q2H PRN


   PRN Reason: Pain (severe 7-10)


Ondansetron HCl (Zofran Odt)  4 mg PO Q6H PRN


   PRN Reason: Nausea able to take PO


Ondansetron HCl (Zofran)  4 mg IV Q4H PRN


   PRN Reason: Nausea/Vomiting


Oxycodone HCl (Oxycodone)  5 mg PO Q4H PRN


   PRN Reason: Pain (moderate 4-6)


Pantoprazole Sodium (Protonix Iv***)  40 mg IVPUSH Q24H [facility]


Simethicone (Simethicone)  240 mg PO DAILY PRN


   PRN Reason: Gas





Discontinued Medications





Acetaminophen (Tylenol)  650 mg PO NOW ONE


   Stop: 07/22/18 22:37


   Last Admin: 07/22/18 22:43 Dose:  650 mg


Enoxaparin Sodium (Lovenox)  40 mg SUBCUT ONETIME ONE


   Stop: 07/23/18 01:01


   Last Admin: 07/23/18 05:14 Dose:  Not Given


Ciprofloxacin/Dextrose 400 mg/ (Premix)  200 mls @ 200 mls/hr IV Q12H [facility]


   Last Admin: 07/22/18 22:32 Dose:  200 mls/hr


Sodium Chloride (Normal Saline)  1,000 mls @ 999 mls/hr IV ASDIRECTED [facility]


   Last Admin: 07/22/18 22:32 Dose:  999 mls/hr


Ciprofloxacin/Dextrose 400 mg/ (Premix)  200 mls @ 200 mls/hr IV Q12H [facility]


Lactobacillus Rhamnosus (Culturelle)  1 cap PO DAILY [facility]


   Last Admin: 07/23/18 10:03 Dose:  1 cap


Pantoprazole Sodium (Protonix Iv***)  40 mg IVPUSH Q24H [facility]


   Last Admin: 07/23/18 05:15 Dose:  Not Given


Pantoprazole Sodium (Protonix Iv***)  40 mg IVPUSH ONETIME ONE


   Stop: 07/22/18 23:46


   Last Admin: 07/23/18 00:18 Dose:  40 mg











- Exam


General: Alert, Oriented, Cooperative, No Acute Distress


Lungs: Clear to Auscultation, Normal Respiratory Effort


Cardiovascular: Regular Rate, Regular Rhythm, No Murmurs


GI/Abdominal Exam: Soft, Non-Tender, No Organomegaly, No Distention


Extremities: Non-Tender, No Pedal Edema


Skin: Warm, Dry, Intact





- Problem List Review


Problem List Initiated/Reviewed/Updated: Yes





- My Orders


Last 24 Hours: 


My Active Orders





07/23/18 09:00


cefTRIAXone [Rocephin] 1 gm   Sodium Chloride 0.9% [Normal Saline] 50 ml IV 

Q24H 





07/23/18 09:25


PT Evaluation and Treatment [CONS] Routine 





07/23/18 12:26


LACTIC ACID [CHEM] Stat 





07/23/18 12:45


Sodium Chloride 0.9% [Normal Saline] 1,000 ml IV ASDIRECTED 





07/23/18 21:00


Lactobacillus Rhamnosus GG [Culturelle]   1 cap PO BID 





07/24/18 05:00


BASIC METABOLIC PANEL,BMP [CHEM] Timed 


CBC WITH AUTO DIFF [HEME] Timed 


MAGNESIUM [CHEM] Timed 














- Plan


Plan:: 





ASSESSMENT / PLAN





Sepsis due to bladder infection-both blood cultures growing gram-negative rods, 

final ID and sensitivities pending


-IV Fluids for rehydration NS at 125 mL per hour


-IV Antibiotic: Rocephin 1 g IV every 24 hours


-Tyenol for fever control        


-urine culture pending





Diabetes type 2; diet controlled


-consistent carb diet


-4 times a day glucometers


-Low-dose sliding scale NovoLog





Maintenance issues


-Orders home meds: ordered


-Nutrition: diabetic diet


-Graham catheter not indicated at this time


-DVT: SCD


-PPI: IV Protonix 40mg daily





CODE STATUS: DNR/DNI





Admission status: This patient will be admitted for inpatient services and is 

medically appropriate meeting medical necessity for inpatient admission as 

outlined in my documentation. I reasonably expect the patient will require 

inpatient services that span.  Time over 2 midnights.  I reasonably expect this 

patient to be discharged or transferred within 96 hours after admission to the 

critical access hospital.





Disposition; home 





Primary care provider: Outside Provider, ALEXIS Webb





Hospitalist: Dr. Carr

## 2018-07-23 NOTE — CR
CHEST: Portable

 

CLINICAL HISTORY:Pain

 

COMPARISON:None

 

FINDINGS:  Heart and pulmonary vascularity appear normal. Lung fields are clear. There is a left subc
lavian central venous line in place. Tip is in the superior vena cava

 

 

IMPRESSION:  No acute cardiopulmonary process

## 2018-07-24 RX ADMIN — Medication SCH CAP: at 21:30

## 2018-07-24 RX ADMIN — Medication SCH CAP: at 08:06

## 2018-07-24 RX ADMIN — COLESTIPOL HYDROCHLORIDE SCH GM: 1 TABLET ORAL at 21:29

## 2018-07-24 NOTE — PCM.PN
- General Info


Date of Service: 07/24/18


Subjective Update: 





Mr. Macedo is been stable since yesterday, blood pressure has improved. Blood 

cultures are growing gram-negative roya, final ID and sensitivities are pending. 

Overall strength seems to be better and he has been able to be out ambulating 

in the hallways.


Functional Status: Reports: Pain Controlled, Tolerating Diet, Ambulating, 

Urinating





- Review of Systems


General: Reports: Weakness.  Denies: Fever, Chills


Pulmonary: Reports: No Symptoms


Cardiovascular: Reports: No Symptoms


Gastrointestinal: Reports: No Symptoms


Genitourinary: Reports: No Symptoms





- Patient Data


Vitals - Most Recent: 


 Last Vital Signs











Temp  98.3 F   07/24/18 10:41


 


Pulse  78   07/24/18 10:41


 


Resp  18   07/24/18 10:41


 


BP  107/52 L  07/24/18 10:41


 


Pulse Ox  97   07/24/18 10:41











Weight - Most Recent: 142 lb 14.388 oz


I&O - Last 24 Hours: 


 Intake & Output











 07/23/18 07/24/18 07/24/18





 22:59 06:59 14:59


 


Intake Total 2013 1891 150


 


Output Total  700 350


 


Balance 2013 1191 -200











Lab Results Last 24 Hours: 


 Laboratory Results - last 24 hr











  07/23/18 07/23/18 07/24/18 Range/Units





  12:40 19:11 04:30 


 


WBC     (4.5-11.0)  K/uL


 


RBC     (4.30-5.90)  M/uL


 


Hgb     (12.0-15.0)  g/dL


 


Hct     (40.0-54.0)  %


 


MCV     (80-98)  fL


 


MCH     (27-31)  pg


 


MCHC     (32-36)  %


 


Plt Count     (150-400)  K/uL


 


Neut % (Auto)     (36-66)  %


 


Lymph % (Auto)     (24-44)  %


 


Mono % (Auto)     (2-6)  %


 


Eos % (Auto)     (2-4)  %


 


Baso % (Auto)     (0-1)  %


 


Sodium     (140-148)  mmol/L


 


Potassium     (3.6-5.2)  mmol/L


 


Chloride     (100-108)  mmol/L


 


Carbon Dioxide     (21-32)  mmol/L


 


Anion Gap     (5.0-14.0)  mmol/L


 


BUN     (7-18)  mg/dL


 


Creatinine     (0.8-1.3)  mg/dL


 


Est Cr Clr Drug Dosing     mL/min


 


Estimated GFR (MDRD)     (>60)  


 


Glucose     ()  mg/dL


 


Lactic Acid  2.1 H  1.5  1.2  (0.4-2.0)  mmol/L


 


Calcium     (8.5-10.1)  mg/dL


 


Magnesium     (1.8-2.4)  mg/dL














  07/24/18 07/24/18 Range/Units





  04:49 04:49 


 


WBC  7.7   (4.5-11.0)  K/uL


 


RBC  3.22 L   (4.30-5.90)  M/uL


 


Hgb  10.5 L   (12.0-15.0)  g/dL


 


Hct  29.7 L   (40.0-54.0)  %


 


MCV  92   (80-98)  fL


 


MCH  33 H   (27-31)  pg


 


MCHC  35   (32-36)  %


 


Plt Count  105 L   (150-400)  K/uL


 


Neut % (Auto)  74 H   (36-66)  %


 


Lymph % (Auto)  13 L   (24-44)  %


 


Mono % (Auto)  13 H   (2-6)  %


 


Eos % (Auto)  0 L   (2-4)  %


 


Baso % (Auto)  0   (0-1)  %


 


Sodium   135 L  (140-148)  mmol/L


 


Potassium   3.8  (3.6-5.2)  mmol/L


 


Chloride   104  (100-108)  mmol/L


 


Carbon Dioxide   21  (21-32)  mmol/L


 


Anion Gap   13.8  (5.0-14.0)  mmol/L


 


BUN   15  (7-18)  mg/dL


 


Creatinine   1.0  (0.8-1.3)  mg/dL


 


Est Cr Clr Drug Dosing   48.61  mL/min


 


Estimated GFR (MDRD)   > 60  (>60)  


 


Glucose   125 H  ()  mg/dL


 


Lactic Acid    (0.4-2.0)  mmol/L


 


Calcium   7.4 L  (8.5-10.1)  mg/dL


 


Magnesium   1.5 L  (1.8-2.4)  mg/dL











Omer Results Last 24 Hours: 


 Microbiology











 07/22/18 20:45 Aerobic Blood Culture - Preliminary





 Blood - Venous Anaerobic Blood Culture - Preliminary





    NO GROWTH AFTER 1 DAY


 


 07/22/18 20:45 Aerobic Blood Culture - Preliminary





 Blood - Venous - Lab Draw Anaerobic Blood Culture - Preliminary





    NO GROWTH AFTER 1 DAY


 


 07/22/18 22:00 Urine Culture - Preliminary





 Urine, Clean Catch 











Med Orders - Current: 


 Current Medications





Acetaminophen (Tylenol)  650 mg PO Q4H PRN


   PRN Reason: Pain (Mild 1-3)/fever


   Last Admin: 07/24/18 08:05 Dose:  650 mg


Albuterol (Proventil Neb Soln)  2.5 mg NEB Q4H PRN


   PRN Reason: Shortness Of Breath/wheezing


Bisacodyl (Dulcolax)  5 mg PO DAILY PRN


   PRN Reason: Constipation


Colestipol HCl (Colestipol Hcl)  2 gm PO BEDTIME [facility]


   Last Admin: 07/23/18 21:23 Dose:  2 gm


Dextrose (Glutose 15)  15 gm PO ONETIME PRN


   PRN Reason: Hypoglycemia


Dextrose/Water (Dextrose 50% In Water)  50 ml IV ASDIRECTED PRN


   PRN Reason: Hypoglycemia


Docusate Sodium (Colace)  100 mg PO BID PRN


   PRN Reason: Constipation


Enoxaparin Sodium (Lovenox)  40 mg SUBCUT DAILY [facility]


   Last Admin: 07/24/18 08:07 Dose:  40 mg


Finasteride (Proscar)  5 mg PO DAILY [facility]


   Last Admin: 07/24/18 08:07 Dose:  5 mg


Ceftriaxone Sodium 1 gm/ (Sodium Chloride)  50 mls @ 100 mls/hr IV Q24H [facility]


   Last Admin: 07/24/18 08:14 Dose:  100 mls/hr


Magnesium Sulfate 2 gm/ Premix  50 mls @ 25 mls/hr IV ONETIME ONE


   Stop: 07/24/18 11:59


   Last Admin: 07/24/18 09:47 Dose:  25 mls/hr


Ibuprofen (Motrin)  400 mg PO Q6H PRN


   PRN Reason: Pain (mild 1-3)


   Last Admin: 07/23/18 14:14 Dose:  400 mg


Insulin Aspart (Novolog)  0 unit SUBCUT QIDACANDBED [facility]; Protocol


   Last Admin: 07/24/18 08:02 Dose:  Not Given


Lactobacillus Rhamnosus (Culturelle)  1 cap PO BID [facility]


   Last Admin: 07/24/18 08:06 Dose:  1 cap


Loperamide HCl (Imodium)  2 mg PO BID [facility]


   Last Admin: 07/24/18 08:07 Dose:  2 mg


Lorazepam (Ativan)  1 mg IV Q6H PRN


   PRN Reason: Nausea/Vomiting


Magnesium Oxide (Magnesium Oxide)  400 mg PO BID [facility]


   Last Admin: 07/24/18 10:25 Dose:  400 mg


Melatonin (Melatonin)  6 mg PO BEDTIME PRN


   PRN Reason: Insomnia


Morphine Sulfate (Morphine)  2 mg IVPUSH Q2H PRN


   PRN Reason: Pain (severe 7-10)


Ondansetron HCl (Zofran Odt)  4 mg PO Q6H PRN


   PRN Reason: Nausea able to take PO


Ondansetron HCl (Zofran)  4 mg IV Q4H PRN


   PRN Reason: Nausea/Vomiting


Oxycodone HCl (Oxycodone)  5 mg PO Q4H PRN


   PRN Reason: Pain (moderate 4-6)


Pantoprazole Sodium (Protonix***)  40 mg PO BEDTIME [facility]


Simethicone (Simethicone)  240 mg PO DAILY PRN


   PRN Reason: Gas





Discontinued Medications





Acetaminophen (Tylenol)  650 mg PO NOW ONE


   Stop: 07/22/18 22:37


   Last Admin: 07/22/18 22:43 Dose:  650 mg


Enoxaparin Sodium (Lovenox)  40 mg SUBCUT ONETIME ONE


   Stop: 07/23/18 01:01


   Last Admin: 07/23/18 05:14 Dose:  Not Given


Ciprofloxacin/Dextrose 400 mg/ (Premix)  200 mls @ 200 mls/hr IV Q12H [facility]


   Last Admin: 07/22/18 22:32 Dose:  200 mls/hr


Sodium Chloride (Normal Saline)  1,000 mls @ 999 mls/hr IV ASDIRECTED [facility]


   Last Admin: 07/22/18 22:32 Dose:  999 mls/hr


Sodium Chloride (Normal Saline)  1,000 mls @ 125 mls/hr IV ASDIRECTED [facility]


   Last Admin: 07/24/18 01:47 Dose:  125 mls/hr


Ciprofloxacin/Dextrose 400 mg/ (Premix)  200 mls @ 200 mls/hr IV Q12H [facility]


Sodium Chloride (Normal Saline)  1,000 mls @ 250 mls/hr IV ASDIRECTED [facility]


   Stop: 07/23/18 16:46


Lactobacillus Rhamnosus (Culturelle)  1 cap PO DAILY [facility]


   Last Admin: 07/23/18 10:03 Dose:  1 cap


Pantoprazole Sodium (Protonix Iv***)  40 mg IVPUSH Q24H [facility]


   Last Admin: 07/23/18 05:15 Dose:  Not Given


Pantoprazole Sodium (Protonix Iv***)  40 mg IVPUSH ONETIME ONE


   Stop: 07/22/18 23:46


   Last Admin: 07/23/18 00:18 Dose:  40 mg


Pantoprazole Sodium (Protonix Iv***)  40 mg IVPUSH Q24H [facility]


   Last Admin: 07/23/18 21:24 Dose:  40 mg











- Exam


Quality Assessment: DVT Prophylaxis


General: Alert, Oriented, Cooperative, No Acute Distress


Lungs: Clear to Auscultation, Normal Respiratory Effort


Cardiovascular: Regular Rate, Regular Rhythm


GI/Abdominal Exam: Soft, Non-Tender, No Organomegaly, No Distention


Extremities: Non-Tender, No Pedal Edema


Skin: Warm, Dry, Intact





- Problem List Review


Problem List Initiated/Reviewed/Updated: Yes





- My Orders


Last 24 Hours: 


My Active Orders





07/23/18 12:57


Blood Glucose Check, Bedside [RC] QIDACANDBED 


Communication Order [RC] STAT 


Diabetes Education [RC] Click to Edit 


Notify Provider [RC] PRN 


Dextrose 50% in Water   50 ml IV ASDIRECTED PRN 


Dextrose [Glutose 15]   15 gm PO ONETIME PRN 





07/23/18 17:00


Insulin Aspart [NovoLOG]   See Protocol  SUBCUT QIDACANDBED 





07/23/18 21:00


Lactobacillus Rhamnosus GG [Culturelle]   1 cap PO BID 





07/24/18 09:15


Magnesium Oxide   400 mg PO BID 





07/24/18 10:00


Magnesium Sulfate/Water [Magnesium Sulfate 2 GM in Water 50 ML] 2 gm   Premix 

Bag 1 bag IV ONETIME 





07/24/18 11:15


Convert IV to Saline Lock [OM.PC] Routine 





07/24/18 11:30


GLUCOSE POC LAB TO COLLECT [POC] QIDACANDBED 





07/24/18 16:30


GLUCOSE POC LAB TO COLLECT [POC] QIDACANDBED 





07/24/18 21:00


GLUCOSE POC LAB TO COLLECT [POC] QIDACANDBED 





07/25/18 07:30


GLUCOSE POC LAB TO COLLECT [POC] QIDACANDBED 





07/25/18 11:30


GLUCOSE POC LAB TO COLLECT [POC] QIDACANDBED 





07/25/18 16:30


GLUCOSE POC LAB TO COLLECT [POC] QIDACANDBED 





07/25/18 21:00


GLUCOSE POC LAB TO COLLECT [POC] QIDACANDBED 





07/26/18 07:30


GLUCOSE POC LAB TO COLLECT [POC] QIDACANDBED 





07/26/18 11:30


GLUCOSE POC LAB TO COLLECT [POC] QIDACANDBED 





07/26/18 16:30


GLUCOSE POC LAB TO COLLECT [POC] QIDACANDBED 





07/26/18 21:00


GLUCOSE POC LAB TO COLLECT [POC] QIDACANDBED 





07/27/18 07:30


GLUCOSE POC LAB TO COLLECT [POC] QIDACANDBED 





07/27/18 11:30


GLUCOSE POC LAB TO COLLECT [POC] QIDACANDBED 





07/27/18 16:30


GLUCOSE POC LAB TO COLLECT [POC] QIDACANDBED 





07/27/18 21:00


GLUCOSE POC LAB TO COLLECT [POC] QIDACANDBED 





07/28/18 07:30


GLUCOSE POC LAB TO COLLECT [POC] QIDACANDBED 





07/28/18 11:30


GLUCOSE POC LAB TO COLLECT [POC] QIDACANDBED 














- Plan


Plan:: 





ASSESSMENT / PLAN





Sepsis due to bladder infection-both blood cultures growing gram-negative rods, 

final ID and sensitivities pending. Improved since yesterday with increased 

strength, no fevers


-Saline lock IV


-IV Antibiotic: Rocephin 1 g IV every 24 hours


-Tyenol for fever control        


-urine culture ID and sensitivities pending





Diabetes type 2; diet controlled


-consistent carb diet


-4 times a day glucometers


-Low-dose sliding scale NovoLog





Maintenance issues


-Orders home meds: ordered


-Nutrition: diabetic diet


-Graham catheter not indicated at this time


-DVT: SCD


-PPI: IV Protonix 40mg daily





CODE STATUS: DNR/DNI





Admission status: This patient will be admitted for inpatient services and is 

medically appropriate meeting medical necessity for inpatient admission as 

outlined in my documentation. I reasonably expect the patient will require 

inpatient services that span.  Time over 2 midnights.  I reasonably expect this 

patient to be discharged or transferred within 96 hours after admission to the 

[facility].





Disposition; home 





Primary care provider: Outside Provider, ALEXIS Webb





Hospitalist: Dr. Carr

## 2018-07-25 RX ADMIN — Medication SCH CAP: at 08:01

## 2018-07-25 NOTE — [id number]
**Discharge Summary





- Hospital Course


Brief History: Mr. Macedo is an 86-year-old gentleman who was admitted through 

the emergency department with weakness and fever secondary to a urinary tract 

infection with sepsis.





- Discharge Data


Discharge Date: 07/25/18


Discharge Disposition: Home, [facility]


Condition: Fair





- Discharge Diagnosis/Problem(s)


(1) Urinary tract infection


SNOMED Code(s): 04954557


   ICD Code: N39.0 - URINARY TRACT INFECTION, SITE NOT SPECIFIED   Status: 

Acute   Current Visit: Yes   





(2) Sepsis


SNOMED Code(s): 91619913


   ICD Code: A41.9 - SEPSIS, UNSPECIFIED ORGANISM   Status: Acute   Current 

Visit: Yes   





(3) Diabetes mellitus type 2 in nonobese


SNOMED Code(s): 201648973


   ICD Code: E11.9 - TYPE 2 DIABETES MELLITUS WITHOUT COMPLICATIONS   Status: 

Chronic   Priority: High   Current Visit: Yes   





- Patient Summary/Data


Consults: 


 Consultations





07/23/18 09:25


PT Evaluation and Treatment [CONS] Routine 


   Please Evaluate and Treat.


   PT Reason for Consult: Strengthening


   Pending Discharge: Yes


   Discharge Disposition: Home


   This query below is only for informational purposes and is not editable.


   Admission Diagnosis/Problem: Urosepsis











Hospital Course: 





Mr. Macedo is an 86-year-old gentleman who had had symptoms of progressive 

weakness and fever. He presented to the emergency department for further 

evaluation and on assessment was found to have evidence of urinary tract 

infection and probable sepsis. Chest x-ray and other studies showed no evidence 

of other obvious source of infection. He was given IV fluids for hydration and 

management of sepsis and started initially on IV antibiotic therapy with 

ciprofloxacin. This was changed shortly after admission to ceftriaxone. Blood 

cultures obtained at the time of admission did come back positive for 

Klebsiella pneumonia which was for the most part pansensitive. Urine culture 

also grew out Klebsiella pneumonia. Over the next few days of hospital stay he 

remained afebrile with stabilization of vital signs. White blood cell count 

normalized and he was feeling well and walking in the hallways with use of a 

walker prior to discharge. He has a known history of diet managed type 2 

diabetes mellitus. During his hospital stay glucometers were measured 4 times 

daily and he was treated with low-dose sliding scale NovoLog. Activity will be 

as tolerated and he will resume his usual diabetic diet. He will be on 

ciprofloxacin 500 mg twice daily for an additional 5 days. He should continue 

to take probiotic therapy as he was prior to admission. Home care will be 

scheduled including home physical therapy. Follow-up appointment will be 

scheduled with his primary care provider within one week.





- Patient Instructions


Diet: Diabetic Diet


Activity: As Tolerated


Other/Special Instructions: Discharge to home with home care services. Follow-

up appointment with primary care provider within one week.





- Discharge Plan


*PRESCRIPTION DRUG MONITORING PROGRAM REVIEWED*: Not Applicable


*COPY OF PRESCRIPTION DRUG MONITORING REPORT IN PATIENT THALIA: Not Applicable


Prescriptions/Med Rec: 


Ciprofloxacin HCl [Cipro] 500 mg PO BID #10 tablet


Home Medications: 


 Home Meds





Aspirin [Rosa Chewable Aspirin] 81 mg PO DAILY 07/22/18 [History]


Bifidobacterium Infantis [Align] 4 mg PO DAILY 07/22/18 [History]


Colestipol HCl 2 tab PO BEDTIME 07/22/18 [History]


Finasteride 5 mg PO DAILY 07/22/18 [History]


Loperamide [Imodium] 2 mg PO BID 07/22/18 [History]


Simethicone [Phazyme] 250 mg PO DAILY PRN 07/22/18 [History]


Ciprofloxacin HCl [Cipro] 500 mg PO BID #10 tablet 07/25/18 [Rx]








Forms:  ED Department Discharge





- Discharge Summary/Plan Comment


DC Time >30 min.: No





- Patient Data


Vitals - Most Recent: 


 Last Vital Signs











Temp  98.2 F   07/25/18 07:00


 


Pulse  75   07/25/18 07:00


 


Resp  16   07/25/18 07:00


 


BP  128/69   07/25/18 07:00


 


Pulse Ox  96   07/25/18 07:00











Weight - Most Recent: 142 lb 14.388 oz


I&O - Last 24 hours: 


 Intake & Output











 07/24/18 07/25/18 07/25/18





 22:59 06:59 14:59


 


Intake Total   500


 


Output Total 850 300 300


 


Balance -850 -300 200











RASHID Results - Last 24 hrs: 


 Microbiology











 07/22/18 20:45 Aerobic Blood Culture - Final





 Blood - Venous    Klebsiella Pneumonia Ss Pneumo





 Anaerobic Blood Culture - Preliminary





    NO GROWTH AFTER 2 DAYS


 


 07/22/18 20:45 Aerobic Blood Culture - Final





 Blood - Venous - Lab Draw    Klebsiella Pneumonia Ss Pneumo





 Anaerobic Blood Culture - Preliminary





    NO GROWTH AFTER 2 DAYS


 


 07/22/18 22:00 Urine Culture - Final





 Urine, Clean Catch    Klebsiella Pneumonia Ss Pneumo











Med Orders - Current: 


 Current Medications





Acetaminophen (Tylenol)  650 mg PO Q4H PRN


   PRN Reason: Pain (Mild 1-3)/fever


   Last Admin: 07/24/18 21:35 Dose:  650 mg


Albuterol (Proventil Neb Soln)  2.5 mg NEB Q4H PRN


   PRN Reason: Shortness Of Breath/wheezing


Bisacodyl (Dulcolax)  5 mg PO DAILY PRN


   PRN Reason: Constipation


Colestipol HCl (Colestipol Hcl)  2 gm PO BEDTIME [facility]


   Last Admin: 07/24/18 21:29 Dose:  2 gm


Dextrose (Glutose 15)  15 gm PO ONETIME PRN


   PRN Reason: Hypoglycemia


Dextrose/Water (Dextrose 50% In Water)  50 ml IV ASDIRECTED PRN


   PRN Reason: Hypoglycemia


Docusate Sodium (Colace)  100 mg PO BID PRN


   PRN Reason: Constipation


Enoxaparin Sodium (Lovenox)  40 mg SUBCUT DAILY [facility]


   Last Admin: 07/25/18 08:01 Dose:  40 mg


Finasteride (Proscar)  5 mg PO DAILY [facility]


   Last Admin: 07/25/18 08:01 Dose:  5 mg


Ceftriaxone Sodium 1 gm/ (Sodium Chloride)  50 mls @ 100 mls/hr IV Q24H [facility]


   Last Admin: 07/25/18 08:00 Dose:  100 mls/hr


Ibuprofen (Motrin)  400 mg PO Q6H PRN


   PRN Reason: Pain (mild 1-3)


   Last Admin: 07/23/18 14:14 Dose:  400 mg


Insulin Aspart (Novolog)  0 unit SUBCUT QIDACANDBED [facility]; Protocol


   Last Admin: 07/25/18 07:55 Dose:  Not Given


Lactobacillus Rhamnosus (Culturelle)  1 cap PO BID [facility]


   Last Admin: 07/25/18 08:01 Dose:  1 cap


Loperamide HCl (Imodium)  2 mg PO BID [facility]


   Last Admin: 07/25/18 08:01 Dose:  2 mg


Lorazepam (Ativan)  1 mg IV Q6H PRN


   PRN Reason: Nausea/Vomiting


Magnesium Oxide (Magnesium Oxide)  400 mg PO BID [facility]


   Last Admin: 07/25/18 08:01 Dose:  400 mg


Melatonin (Melatonin)  6 mg PO BEDTIME PRN


   PRN Reason: Insomnia


Morphine Sulfate (Morphine)  2 mg IVPUSH Q2H PRN


   PRN Reason: Pain (severe 7-10)


Ondansetron HCl (Zofran Odt)  4 mg PO Q6H PRN


   PRN Reason: Nausea able to take PO


Ondansetron HCl (Zofran)  4 mg IV Q4H PRN


   PRN Reason: Nausea/Vomiting


Oxycodone HCl (Oxycodone)  5 mg PO Q4H PRN


   PRN Reason: Pain (moderate 4-6)


Pantoprazole Sodium (Protonix***)  40 mg PO BEDTIME [facility]


   Last Admin: 07/24/18 21:31 Dose:  40 mg


Simethicone (Simethicone)  240 mg PO DAILY PRN


   PRN Reason: Gas





Discontinued Medications





Acetaminophen (Tylenol)  650 mg PO NOW ONE


   Stop: 07/22/18 22:37


   Last Admin: 07/22/18 22:43 Dose:  650 mg


Enoxaparin Sodium (Lovenox)  40 mg SUBCUT ONETIME ONE


   Stop: 07/23/18 01:01


   Last Admin: 07/23/18 05:14 Dose:  Not Given


Ciprofloxacin/Dextrose 400 mg/ (Premix)  200 mls @ 200 mls/hr IV Q12H [facility]


   Last Admin: 07/22/18 22:32 Dose:  200 mls/hr


Sodium Chloride (Normal Saline)  1,000 mls @ 999 mls/hr IV ASDIRECTED [facility]


   Last Admin: 07/22/18 22:32 Dose:  999 mls/hr


Sodium Chloride (Normal Saline)  1,000 mls @ 125 mls/hr IV ASDIRECTED [facility]


   Last Admin: 07/24/18 01:47 Dose:  125 mls/hr


Ciprofloxacin/Dextrose 400 mg/ (Premix)  200 mls @ 200 mls/hr IV Q12H [facility]


Sodium Chloride (Normal Saline)  1,000 mls @ 250 mls/hr IV ASDIRECTED [facility]


   Stop: 07/23/18 16:46


Magnesium Sulfate 2 gm/ Premix  50 mls @ 25 mls/hr IV ONETIME ONE


   Stop: 07/24/18 11:59


   Last Admin: 07/24/18 09:47 Dose:  25 mls/hr


Lactobacillus Rhamnosus (Culturelle)  1 cap PO DAILY [facility]


   Last Admin: 07/23/18 10:03 Dose:  1 cap


Pantoprazole Sodium (Protonix Iv***)  40 mg IVPUSH Q24H [facility]


   Last Admin: 07/23/18 05:15 Dose:  Not Given


Pantoprazole Sodium (Protonix Iv***)  40 mg IVPUSH ONETIME ONE


   Stop: 07/22/18 23:46


   Last Admin: 07/23/18 00:18 Dose:  40 mg


Pantoprazole Sodium (Protonix Iv***)  40 mg IVPUSH Q24H [facility]


   Last Admin: 07/23/18 21:24 Dose:  40 mg











- Exam


Quality Assessment: Reports: DVT Prophylaxis


General: Reports: Alert, Oriented, Cooperative, No Acute Distress


Lungs: Reports: Clear to Auscultation, Normal Respiratory Effort


Cardiovascular: Reports: Regular Rate, Regular Rhythm, No Murmurs


GI/Abdominal Exam: Soft, Non-Tender, No Organomegaly, No Distention


Extremities: Non-Tender, No Pedal Edema


Skin: Reports: Warm, Dry, Intact